# Patient Record
Sex: MALE | Race: WHITE | NOT HISPANIC OR LATINO | Employment: STUDENT | URBAN - METROPOLITAN AREA
[De-identification: names, ages, dates, MRNs, and addresses within clinical notes are randomized per-mention and may not be internally consistent; named-entity substitution may affect disease eponyms.]

---
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
## 2023-06-05 DIAGNOSIS — M25.562 LEFT KNEE PAIN, UNSPECIFIED CHRONICITY: Primary | ICD-10-CM

## 2023-06-14 ENCOUNTER — OFFICE VISIT (OUTPATIENT)
Dept: FAMILY MEDICINE | Facility: CLINIC | Age: 18
End: 2023-06-14
Payer: OTHER GOVERNMENT

## 2023-06-14 VITALS
DIASTOLIC BLOOD PRESSURE: 76 MMHG | SYSTOLIC BLOOD PRESSURE: 120 MMHG | BODY MASS INDEX: 22.75 KG/M2 | WEIGHT: 168 LBS | HEART RATE: 70 BPM | HEIGHT: 72 IN | TEMPERATURE: 98 F | OXYGEN SATURATION: 98 %

## 2023-06-14 DIAGNOSIS — B07.0 PLANTAR WART OF RIGHT FOOT: Primary | ICD-10-CM

## 2023-06-14 DIAGNOSIS — F41.0 SEVERE ANXIETY WITH PANIC: ICD-10-CM

## 2023-06-14 PROCEDURE — 99214 OFFICE O/P EST MOD 30 MIN: CPT | Mod: S$PBB,,, | Performed by: NURSE PRACTITIONER

## 2023-06-14 PROCEDURE — 99214 OFFICE O/P EST MOD 30 MIN: CPT | Performed by: NURSE PRACTITIONER

## 2023-06-14 PROCEDURE — 99214 PR OFFICE/OUTPT VISIT, EST, LEVL IV, 30-39 MIN: ICD-10-PCS | Mod: S$PBB,,, | Performed by: NURSE PRACTITIONER

## 2023-06-14 NOTE — PROGRESS NOTES
SUBJECTIVE:      Patient ID: Omega Barker is a 17 y.o. male.    Chief Complaint: Foot Injury (Pt states he has a hole in his foot)    17-year-old male presents to the clinic with complaints of right foot issues.  He reports having a plantar wart to the bottom of his right foot, which has been present for several years.  He has tried otc treatments without relief.  Denies pain or symptoms.  He would like it removed so it doesn't cause issues in the future.    He is requesting a letter for an emotional support animal to allow his pet to live with him at his new apartment. Patients chart was reviewed.  He was diagnosed with sever anxiety in 2020, referred to psych, but does not see a psychiatrics. He is not prescribed any antianxiety medications. He states he is able to manage his symptoms without medications.       Family History   Problem Relation Age of Onset    No Known Problems Mother     No Known Problems Father       Social History     Socioeconomic History    Marital status: Single   Tobacco Use    Smoking status: Former     Types: Vaping with nicotine    Smokeless tobacco: Never   Substance and Sexual Activity    Alcohol use: Not Currently     Comment: very rare    Drug use: Not Currently     Current Outpatient Medications   Medication Sig Dispense Refill    ibuprofen (ADVIL,MOTRIN) 600 MG tablet Take 1 tablet (600 mg total) by mouth every 6 (six) hours. (Patient not taking: Reported on 6/14/2023) 20 tablet 0    naproxen (NAPROSYN) 500 MG tablet Take 1 tablet (500 mg total) by mouth 2 (two) times daily as needed (As needed for pain, take with meals). (Patient not taking: Reported on 10/27/2021) 20 tablet 0     No current facility-administered medications for this visit.     Review of patient's allergies indicates:  No Known Allergies   Past Medical History:   Diagnosis Date    Anxiety      History reviewed. No pertinent surgical history.    Review of Systems   Constitutional:  Negative for  activity change, appetite change, chills, diaphoresis, fatigue, fever and unexpected weight change.   HENT:  Negative for congestion, ear pain, sinus pressure, sore throat, trouble swallowing and voice change.    Eyes:  Negative for pain, discharge and visual disturbance.   Respiratory:  Negative for cough, chest tightness, shortness of breath and wheezing.    Cardiovascular:  Negative for chest pain and palpitations.   Gastrointestinal:  Negative for abdominal pain, constipation, diarrhea, nausea and vomiting.   Genitourinary:  Negative for difficulty urinating, flank pain, frequency and urgency.   Musculoskeletal:  Negative for back pain, joint swelling and neck pain.   Skin:  Positive for wound (Plantar wart to right foot). Negative for color change and rash.   Neurological:  Negative for dizziness, seizures, syncope, weakness, numbness and headaches.   Hematological:  Negative for adenopathy.   Psychiatric/Behavioral:  Negative for dysphoric mood and sleep disturbance. The patient is nervous/anxious.     OBJECTIVE:      Vitals:    06/14/23 0916   BP: 120/76   BP Location: Left arm   Patient Position: Sitting   BP Method: Medium (Manual)   Pulse: 70   Temp: 97.8 °F (36.6 °C)   TempSrc: Oral   SpO2: 98%   Weight: 76.2 kg (168 lb)   Height: 6' (1.829 m)     Physical Exam  Vitals and nursing note reviewed.   Constitutional:       General: He is awake. He is not in acute distress.     Appearance: Normal appearance. He is not ill-appearing, toxic-appearing or diaphoretic.   HENT:      Head: Normocephalic and atraumatic.      Nose: Nose normal.   Eyes:      General: Lids are normal. Gaze aligned appropriately.      Conjunctiva/sclera: Conjunctivae normal.      Right eye: Right conjunctiva is not injected.      Left eye: Left conjunctiva is not injected.      Pupils: Pupils are equal, round, and reactive to light.   Cardiovascular:      Rate and Rhythm: Normal rate and regular rhythm.      Pulses: Normal pulses.            Dorsalis pedis pulses are 2+ on the right side.        Posterior tibial pulses are 2+ on the right side.      Heart sounds: Normal heart sounds, S1 normal and S2 normal. No murmur heard.    No friction rub. No gallop.   Pulmonary:      Effort: Pulmonary effort is normal. No respiratory distress.      Breath sounds: Normal breath sounds. No stridor. No decreased breath sounds, wheezing, rhonchi or rales.   Chest:      Chest wall: No tenderness.   Musculoskeletal:      Cervical back: Neck supple.      Right lower leg: No edema.      Left lower leg: No edema.      Right foot: Normal range of motion.        Feet:    Feet:      Right foot:      Skin integrity: No blister, erythema or warmth.   Lymphadenopathy:      Cervical: No cervical adenopathy.   Skin:     General: Skin is warm and dry.      Capillary Refill: Capillary refill takes less than 2 seconds.      Findings: No erythema or rash.   Neurological:      Mental Status: He is alert and oriented to person, place, and time. Mental status is at baseline.   Psychiatric:         Attention and Perception: Attention normal.         Mood and Affect: Mood is anxious.         Speech: Speech normal.         Behavior: Behavior normal. Behavior is cooperative.         Thought Content: Thought content normal.         Judgment: Judgment normal.      Assessment:       1. Plantar wart of right foot    2. Severe anxiety with panic        Plan:       Plantar wart of right foot  Referral placed to Podiatry for treatment.   -     Ambulatory referral/consult to Podiatry; Future; Expected date: 06/21/2023    Severe anxiety with panic  Letter provided.  Patient informed if his apartment complex does not accept the letter he will need to see psychiatry to receive an official [facility] letter.     This note was created using [employer] voice recognition software that occasionally misinterprets phrases or words.     I spent a total of 34 minutes on the day of the visit.This includes face to face  time and non-face to face time preparing to see the patient (eg, review of tests), obtaining and/or reviewing separately obtained history, documenting clinical information in the electronic or other health record, independently interpreting results and communicating results to the patient/family/caregiver, or care coordinator.    Follow up if symptoms worsen or fail to improve.      6/14/2023 Mane Sneed, ANDREW, FNP

## 2023-06-14 NOTE — LETTER
June 14, 2023      [facility]  [address]  [facility] [address]  Phone: [phone]  Fax: [phone]       Patient: Omega Barker   YOB: 2005  Date of Visit: 06/14/2023    To Whom It May Concern:    Akin Barker  was at [facility] on 06/14/2023. The patient has severe anxiety requiring an emotional support animal. Please allow him to keep his pet at his apartment to help managed and control his symptoms. If you have any questions or concerns, or if I can be of further assistance, please do not hesitate to contact me.    Sincerely,            ANDREW Begum, [id number]

## 2023-06-21 DIAGNOSIS — M25.562 PAIN IN LEFT KNEE: Primary | ICD-10-CM

## 2023-06-22 ENCOUNTER — TELEPHONE (OUTPATIENT)
Dept: FAMILY MEDICINE | Facility: CLINIC | Age: 18
End: 2023-06-22

## 2023-06-22 DIAGNOSIS — G89.29 CHRONIC RIGHT SHOULDER PAIN: Primary | ICD-10-CM

## 2023-06-22 DIAGNOSIS — M25.511 CHRONIC RIGHT SHOULDER PAIN: Primary | ICD-10-CM

## 2023-06-22 NOTE — TELEPHONE ENCOUNTER
Nora from  office called stating that they need a referral per Archie for the patients right shoulder so they can order physical therapy for him.

## 2023-06-30 ENCOUNTER — HOSPITAL ENCOUNTER (OUTPATIENT)
Dept: RADIOLOGY | Facility: HOSPITAL | Age: 18
Discharge: HOME | End: 2023-06-30
Attending: ORTHOPAEDIC SURGERY
Payer: OTHER GOVERNMENT

## 2023-06-30 DIAGNOSIS — M25.562 PAIN IN LEFT KNEE: ICD-10-CM

## 2023-06-30 PROCEDURE — 73721 MRI JNT OF LWR EXTRE W/O DYE: CPT | Mod: TC,PO,LT

## 2024-10-24 DIAGNOSIS — S93.402A SPRAIN OF LEFT ANKLE, UNSPECIFIED LIGAMENT, INITIAL ENCOUNTER: Primary | ICD-10-CM

## 2024-10-28 ENCOUNTER — CLINICAL SUPPORT (OUTPATIENT)
Dept: REHABILITATION | Facility: HOSPITAL | Age: 19
End: 2024-10-28
Payer: OTHER GOVERNMENT

## 2024-10-28 DIAGNOSIS — R29.898 WEAKNESS OF LEFT LOWER EXTREMITY: ICD-10-CM

## 2024-10-28 DIAGNOSIS — S93.402A SPRAIN OF LEFT ANKLE, UNSPECIFIED LIGAMENT, INITIAL ENCOUNTER: ICD-10-CM

## 2024-10-28 DIAGNOSIS — M25.672 STIFFNESS OF LEFT ANKLE JOINT: Primary | ICD-10-CM

## 2024-10-28 PROCEDURE — 97110 THERAPEUTIC EXERCISES: CPT | Mod: PO

## 2024-10-28 PROCEDURE — 97161 PT EVAL LOW COMPLEX 20 MIN: CPT | Mod: PO

## 2024-10-30 ENCOUNTER — CLINICAL SUPPORT (OUTPATIENT)
Dept: REHABILITATION | Facility: HOSPITAL | Age: 19
End: 2024-10-30
Payer: OTHER GOVERNMENT

## 2024-10-30 DIAGNOSIS — M25.672 STIFFNESS OF LEFT ANKLE JOINT: Primary | ICD-10-CM

## 2024-10-30 DIAGNOSIS — R29.898 WEAKNESS OF LEFT LOWER EXTREMITY: ICD-10-CM

## 2024-10-30 PROCEDURE — 97112 NEUROMUSCULAR REEDUCATION: CPT | Mod: PO

## 2024-10-30 PROCEDURE — 97530 THERAPEUTIC ACTIVITIES: CPT | Mod: PO

## 2024-11-04 ENCOUNTER — CLINICAL SUPPORT (OUTPATIENT)
Dept: REHABILITATION | Facility: HOSPITAL | Age: 19
End: 2024-11-04
Payer: OTHER GOVERNMENT

## 2024-11-04 DIAGNOSIS — M25.672 STIFFNESS OF LEFT ANKLE JOINT: Primary | ICD-10-CM

## 2024-11-04 DIAGNOSIS — R29.898 WEAKNESS OF LEFT LOWER EXTREMITY: ICD-10-CM

## 2024-11-04 PROCEDURE — 97140 MANUAL THERAPY 1/> REGIONS: CPT | Mod: PO

## 2024-11-04 PROCEDURE — 97112 NEUROMUSCULAR REEDUCATION: CPT | Mod: PO

## 2024-11-04 PROCEDURE — 97110 THERAPEUTIC EXERCISES: CPT | Mod: PO

## 2024-11-04 PROCEDURE — 97530 THERAPEUTIC ACTIVITIES: CPT | Mod: PO

## 2024-11-04 NOTE — PROGRESS NOTES
"[facility]   Physical Therapy Treatment Note     Name: Omega [facility]  Clinic Number: [id number]    Therapy Diagnosis:   Encounter Diagnoses   Name Primary?    Stiffness of left ankle joint Yes    Weakness of left lower extremity      Physician: Mark Zelaya MD    Visit Date: 11/4/2024    Physician Orders: PT Eval and Treat   Medical Diagnosis from Referral: Sprain of left ankle, unspecified ligament, initial encounter [S93.402A]   Evaluation Date: 10/28/2024  Authorization Period Expiration: 12/31/24  Plan of Care Expiration: 12/23/24  Progress Note Due: 11/28/24  Date of Surgery: n/a  Visit # / Visits authorized: 2/ 15   FOTO: 1/ 3    PTA Visit #: 0/5       Precautions: Standard     Time In: 2:00 pm  Time Out: 3:00 pm  Total Billable Time: 60 minutes      SUBJECTIVE     Pt reports: played in the game yesterday. Had some difficulty, mostly with weakness. Has some increased soreness today, but not bad.   He was compliant with home exercise program.  Response to previous treatment: no adverse effects  Functional change: too soon to tell    Pain: 2/10  Location: left achilles      OBJECTIVE     Objective Measures updated at progress report unless specified.     Treatment     Omega received the treatments listed below:      therapeutic exercises to develop strength, endurance, ROM, and flexibility for 10 minutes including:  Dorsiflexion mobilization on step 15x5"   Soleus stretch 3x20 sec   Tibial nerve glide x15     manual therapy techniques: Joint mobilizations were applied to the: left ankle for 10 minutes, including:  Posterior talar glides  Subtalar mobilizations     neuromuscular re-education activities to improve: Balance, Coordination, Kinesthetic, Sense, and Proprioception for 20 minutes. The following activities were included:  Calf raise isometric 3x45"   Green theraband standing clamshell 3x10 ea   DL banded jumps 3x30, alternating 3x10 ea  SL shuttle jumps 37.5# " "3x15 ea     therapeutic activities to improve functional performance for 20  minutes, including:  BB calf raise 3x8 135#  Telugu split squat 3x8 20#  Power step 12" 3x5 ea   Sled push x3 laps 105#       Patient Education and Home Exercises     Home Exercises Provided and Patient Education Provided     Education provided:   - pathophysiology, expectations    Written Home Exercises Provided: yes. Exercises were reviewed and Omega was able to demonstrate them prior to the end of the session.  Omega demonstrated good  understanding of the education provided. See EMR under Patient Instructions for exercises provided during therapy sessions    ASSESSMENT     Increased subtalar stiffness today. Able to progress achilles loading with improved strength and control. Still has some difficulty with SL pushoff and landing but is improving.     Omega Is progressing well towards his goals.   Pt prognosis is Good.     Pt will continue to benefit from skilled outpatient physical therapy to address the deficits listed in the problem list box on initial evaluation, provide pt/family education and to maximize pt's level of independence in the home and community environment.     Pt's spiritual, cultural and educational needs considered and pt agreeable to plan of care and goals.     Anticipated barriers to physical therapy: schedule, inseason    Goals: Short Term Goals: 4 weeks   - pt will be able to perform doming with single leg activities  - pt will demonstrate 9cm CKC dorsiflexion for improved mobility  - pt will demonstrate appropriate squat and single leg squat mechanics to offload painful structures     Long Term Goals: 8 weeks   - pt will pass return to running criteria and hopping progression for return to running  - pt will demonstrate at least 95% LSI with figure 8, lateral, and square hop testing for decreased reinjury risk  - pt will demonstrate appropriate SL calf raise endurance compared to age norms with incline calf " raise test for improved ankle function  - Patient will demonstrate appropriate mechanics with sport specific activities for full return to sport    PLAN     Continue per POC, addressing strength and mobility deficits as tolerated.     Linsey Mock, PT

## 2025-08-01 ENCOUNTER — TELEPHONE (OUTPATIENT)
Dept: GASTROENTEROLOGY | Facility: CLINIC | Age: 20
End: 2025-08-01
Payer: OTHER GOVERNMENT

## 2025-08-01 NOTE — TELEPHONE ENCOUNTER
Copied from CRM #[id number]. Topic: General Inquiry - Patient Advice  >> Aug 1, 2025  4:44 PM Sudha wrote:  Type:  Needs Medical Advice    Who Called: Pt   Would the patient rather a call back or a response via Mobile [employer]?  Call back   Best Call Back Number: [phone]   Additional Information: Pt has Referral in hand for appt with Gastro and an appt set for 08/04 Please call back to advise    Pt

## 2025-08-01 NOTE — TELEPHONE ENCOUNTER
Call placed to Mr. Duff in regards to message received. He stated he was able to get an appt. No further issues noted.

## 2025-08-04 ENCOUNTER — OFFICE VISIT (OUTPATIENT)
Dept: GASTROENTEROLOGY | Facility: CLINIC | Age: 20
End: 2025-08-04
Payer: OTHER GOVERNMENT

## 2025-08-04 VITALS
SYSTOLIC BLOOD PRESSURE: 107 MMHG | BODY MASS INDEX: 23.41 KG/M2 | HEART RATE: 48 BPM | DIASTOLIC BLOOD PRESSURE: 62 MMHG | WEIGHT: 172.81 LBS | HEIGHT: 72 IN

## 2025-08-04 DIAGNOSIS — R19.8 IRREGULAR BOWEL HABITS: Primary | ICD-10-CM

## 2025-08-04 DIAGNOSIS — Z87.19 HISTORY OF HEMATEMESIS: ICD-10-CM

## 2025-08-04 DIAGNOSIS — R11.0 NAUSEA: ICD-10-CM

## 2025-08-04 DIAGNOSIS — K92.1 BLACK STOOL: ICD-10-CM

## 2025-08-04 DIAGNOSIS — R10.30 LOWER ABDOMINAL PAIN: ICD-10-CM

## 2025-08-04 PROCEDURE — 99213 OFFICE O/P EST LOW 20 MIN: CPT | Mod: PBBFAC,PN

## 2025-08-04 PROCEDURE — 99203 OFFICE O/P NEW LOW 30 MIN: CPT | Mod: S$PBB,,,

## 2025-08-04 PROCEDURE — 99999 PR PBB SHADOW E&M-EST. PATIENT-LVL III: CPT | Mod: PBBFAC,,,

## 2025-08-04 RX ORDER — DEXTROMETHORPHAN HBR, PHENYLEPHRINE HCL, PYRILAMINE MALEATE 7.5; 5; 12.5 MG/5ML; MG/5ML; MG/5ML
10 SYRUP ORAL EVERY 6 HOURS
COMMUNITY
Start: 2025-06-03

## 2025-08-04 RX ORDER — ONDANSETRON 4 MG/1
4 TABLET, ORALLY DISINTEGRATING ORAL 2 TIMES DAILY
COMMUNITY
Start: 2025-06-03

## 2025-08-04 NOTE — PROGRESS NOTES
Subjective:       Patient ID: Omega Barker is a 19 y.o. male Body mass index is 23.44 kg/m².    Chief Complaint: Nausea (Referral, Loss of appetite )    This patient is new to me.  Referring Provider: Aaareferral Self for irregular bowel habits.       GI Problem  The primary symptoms include abdominal pain (states was experiencing lower abdominal pain), nausea and diarrhea. Primary symptoms do not include fever, weight loss, fatigue, vomiting, melena, hematemesis, jaundice, hematochezia, dysuria, myalgias, arthralgias or rash.   The abdominal pain has been resolved since its onset.   The illness does not include dysphagia, bloating or constipation. Associated symptoms comments: This is a 19-year-old male who presents to ED on 6/16/25 with GI upset.  The patient states for the past 2 months he has noted increased frequency of bowel movements, states they have been looser than normal. Over the past 48 hours, he states that his stool has been very dark in color, almost black. He had an episode of vomiting and states it appeared that it might be blood-tinged. He denies current abdominal pain, but does report intermittent abdominal cramping. He denies urinary complaints. He was seen at an urgent care facility yesterday, prescribed Zofran to take as needed. He denies previous medical history.  CBC and CMP unremarkable. Normal coags. UA without evidence of infection. Occult blood negative.  CT of the abdomen and pelvis with no acute findings per the radiologist reports.  Results were discussed in detail with the patient. Discussed the possibility of IBS or other similar illness. Recommend outpatient f/u with GI, referral placed.  Denies smoking and drinking ETOH, reports he no longer sees darker brown-black stools denies intake of Pepto or iron, denies chronic excess NSAID use, denies hematemesis  has not had any coffee-ground emesis. states this occurred once prior to ED room states was not sure if was true blood  or not, denies diarrhea currently reports has a bm 2-3x  per day rates stool type 4 on bristol stool scale, reports dairy and greasy foods can cause loose stools [place] and these foods [place] cause him to have stomach issues as far abdominal cramping or nausea, denies any recent med changes states does not take any medications, denies antbx use, no ill contacts or suspect food intake, denies any GERD symptoms or hx of GERD,was seen by primary care for these symptoms stool testing: parasites cysts ova stool test negative, occult blood stool negative, calprotectin normal, celiac panel negative, CT abdomen normal, pt has not had an EGD or colonoscopy in the past denies fam hx of colon cancer or IBD, overall states feels better and states feels like his symptoms at the time were stemming from anxiety. Associated medical issues do not include inflammatory bowel disease, GERD, gallstones, liver disease, alcohol abuse, PUD, gastric bypass, bowel resection, irritable bowel syndrome, hemorrhoids or diverticulitis.       Review of Systems   Constitutional:  Negative for fatigue, fever and weight loss.   Gastrointestinal:  Positive for abdominal pain (states was experiencing lower abdominal pain), diarrhea and nausea. Negative for abdominal distention, anal bleeding, bloating, blood in stool, constipation, dysphagia, hematemesis, hematochezia, jaundice, melena, rectal pain and vomiting.   Genitourinary:  Negative for dysuria.   Musculoskeletal:  Negative for arthralgias and myalgias.   Skin:  Negative for rash.         No LMP for male patient.  Past Medical History:   Diagnosis Date    Anxiety      History reviewed. No pertinent surgical history.  Family History   Problem Relation Name Age of Onset    No Known Problems Mother      No Known Problems Father      Colon cancer Neg Hx      Crohn's disease Neg Hx      Liver cancer Neg Hx       Social History[1]  Wt Readings from Last 10 Encounters:    08/04/25 78.4 kg (172 lb 13.5 oz)   06/14/23 76.2 kg (168 lb) (70%, Z= 0.51)*   10/27/21 72.6 kg (160 lb) (68%, Z= 0.47)*   10/09/21 72.6 kg (160 lb) (69%, Z= 0.48)*   10/01/21 67.5 kg (148 lb 13 oz) (52%, Z= 0.06)*   10/01/21 67.1 kg (148 lb) (51%, Z= 0.03)*   08/18/21 66.2 kg (146 lb) (49%, Z= -0.03)*   03/01/21 62.6 kg (138 lb 1.6 oz) (40%, Z= -0.26)*   02/22/21 62.1 kg (137 lb) (38%, Z= -0.30)*   01/14/21 58.5 kg (129 lb) (25%, Z= -0.66)*     * Growth percentiles are based on CDC (Boys, 2-20 Years) data.     Lab Results   Component Value Date    WBC 4.41 (L) 01/24/2021    HGB 14.3 01/24/2021    HCT 41.9 01/24/2021    MCV 90 01/24/2021     01/24/2021     CMP  Sodium   Date Value Ref Range Status   06/16/2025 140 136 - 144 mmol/L Final   01/24/2021 139 136 - 145 mmol/L Final     Potassium   Date Value Ref Range Status   06/16/2025 4 3.6 - 5.1 mmol/L Final   01/24/2021 4.2 3.5 - 5.1 mmol/L Final     Chloride   Date Value Ref Range Status   01/24/2021 101 95 - 110 mmol/L Final     CO2   Date Value Ref Range Status   06/16/2025 26 22 - 32 mmol/L Final   01/24/2021 27 23 - 29 mmol/L Final     Glucose   Date Value Ref Range Status   01/24/2021 87 70 - 110 mg/dL Final     BUN   Date Value Ref Range Status   01/24/2021 18 5 - 18 mg/dL Final     Blood Urea Nitrogen   Date Value Ref Range Status   06/16/2025 11 8 - 20 mg/dL Final     Creatinine   Date Value Ref Range Status   06/16/2025 0.91 0.90 - 1.30 mg/dL Final   01/24/2021 0.9 0.5 - 1.4 mg/dL Final     Calcium   Date Value Ref Range Status   06/16/2025 10.1 8.9 - 10.3 mg/dL Final   01/24/2021 9.6 8.7 - 10.5 mg/dL Final     Total Protein   Date Value Ref Range Status   06/16/2025 9 (H) 6.1 - 7.9 g/dL Final   01/24/2021 7.9 6.0 - 8.4 g/dL Final     Albumin   Date Value Ref Range Status   06/16/2025 5 (H) 3.5 - 4.8 g/dL Final   01/24/2021 4.8 (H) 3.2 - 4.7 g/dL Final     Total Bilirubin   Date Value Ref Range Status   06/16/2025 0.8 0.4 - 2.0 mg/dL Final  "  01/24/2021 1.2 (H) 0.1 - 1.0 mg/dL Final     Comment:     For infants and newborns, interpretation of results should be based  on gestational age, weight and in agreement with clinical  observations.    Premature Infant recommended reference ranges:  Up to 24 hours.............<8.0 mg/dL  Up to 48 hours............<12.0 mg/dL  3-5 days..................<15.0 mg/dL  6-29 days.................<15.0 mg/dL       Alkaline Phosphatase   Date Value Ref Range Status   06/16/2025 77 28 - 116 U/L Final   01/24/2021 53 (L) 59 - 164 U/L Final     AST   Date Value Ref Range Status   06/16/2025 16 12 - 40 U/L Final   01/24/2021 37 10 - 40 U/L Final     ALT   Date Value Ref Range Status   06/16/2025 14 5 - 56 U/L Final   01/24/2021 29 10 - 44 U/L Final     Anion Gap   Date Value Ref Range Status   06/16/2025 9 7 - 16 mmol/L Final   01/24/2021 11 8 - 16 mmol/L Final     eGFR if    Date Value Ref Range Status   01/24/2021 SEE COMMENT >60 mL/min/1.73 m^2 Corrected     Comment:     Corrected result; previously reported as >60.0 on 02/12/2021 at 11:03.     eGFR if non    Date Value Ref Range Status   01/24/2021 SEE COMMENT >60 mL/min/1.73 m^2 Corrected     Comment:     Calculation used to obtain the estimated glomerular filtration  rate (eGFR) is the CKD-EPI equation.   Test not performed.  GFR calculation is only valid for patients   18 and older.  Corrected result; previously reported as >60.0 on 02/12/2021 at 11:03.       No results found for: "LIPASE"  No results found for: "LIPASERES"  Lab Results   Component Value Date    TSH 1.080 01/24/2021       Reviewed prior medical records including radiology report of CT abdomen pelvis 6/16/25 & endoscopy history (see surgical history/procedures).    Objective:      Physical Exam  Vitals and nursing note reviewed.   Constitutional:       Appearance: Normal appearance. He is normal weight.   Cardiovascular:      Rate and Rhythm: Normal rate and regular " rhythm.      Heart sounds: Normal heart sounds.   Pulmonary:      Breath sounds: Normal breath sounds.   Abdominal:      General: Bowel sounds are normal.      Palpations: Abdomen is soft.      Tenderness: There is no abdominal tenderness.   Skin:     General: Skin is warm and dry.      Coloration: Skin is not jaundiced.   Neurological:      Mental Status: He is alert and oriented to person, place, and time.   Psychiatric:         Mood and Affect: Mood normal.         Behavior: Behavior normal.         Assessment:       1. Irregular bowel habits    2. Nausea    3. Lower abdominal pain    4. History of hematemesis    5. Black stool        Plan:       Irregular bowel habits   Recommend high fiber diet (20-30 grams of fiber daily)/OTC fiber supplements daily as directed, such as Benefiber or Metamucil.    Nausea    Lower abdominal pain    History of hematemesis    Black stool    Recommended to schedule EGD/colonoscopy for further investigation of symptoms listed above, however patient reports all symptoms have resolved at this time discussed procedures with patient, including risks and benefits, patient verbalized understanding but declined scheduling at this time pt to follow up if symptoms return        Follow up if symptoms worsen or fail to improve.      If no improvement in symptoms or symptoms worsen, call/follow-up at clinic or go to ER.       [facility] - [facility]STRO[facility]  [facility], [place]     Dictation software program was used for this note. Please expect some simple typographical  errors in this note.    Encounter includes face to face time and non-face to face time preparing to see the patient (eg, review of tests), obtaining and/or reviewing separately obtained history, documenting clinical information in the electronic or other health record, independently interpreting results (not separately reported) and communicating results to the patient/family/caregiver, or care  coordination (not separately reported).             [1]   Social History  Tobacco Use    Smoking status: Former     Types: Vaping with nicotine    Smokeless tobacco: Never   Substance Use Topics    Alcohol use: Not Currently     Comment: very rare    Drug use: Not Currently